# Patient Record
Sex: FEMALE | Race: WHITE | NOT HISPANIC OR LATINO | Employment: FULL TIME | URBAN - METROPOLITAN AREA
[De-identification: names, ages, dates, MRNs, and addresses within clinical notes are randomized per-mention and may not be internally consistent; named-entity substitution may affect disease eponyms.]

---

## 2017-01-25 ENCOUNTER — OFFICE VISIT (OUTPATIENT)
Dept: SPORTS MEDICINE | Facility: CLINIC | Age: 57
End: 2017-01-25

## 2017-01-25 VITALS
SYSTOLIC BLOOD PRESSURE: 118 MMHG | BODY MASS INDEX: 22.33 KG/M2 | DIASTOLIC BLOOD PRESSURE: 70 MMHG | WEIGHT: 134 LBS | HEIGHT: 65 IN

## 2017-01-25 DIAGNOSIS — M79.672 LEFT FOOT PAIN: Primary | ICD-10-CM

## 2017-01-25 PROCEDURE — 99204 OFFICE O/P NEW MOD 45 MIN: CPT | Performed by: FAMILY MEDICINE

## 2017-02-08 ENCOUNTER — HOSPITAL ENCOUNTER (OUTPATIENT)
Dept: CARDIOLOGY | Facility: HOSPITAL | Age: 57
Discharge: HOME | End: 2017-02-08
Admitting: FAMILY MEDICINE

## 2017-02-08 ENCOUNTER — HOSPITAL ENCOUNTER (OUTPATIENT)
Dept: CARDIOLOGY | Facility: HOSPITAL | Age: 57
Discharge: HOME | End: 2017-02-08

## 2017-02-08 DIAGNOSIS — M79.672 LEFT FOOT PAIN: ICD-10-CM

## 2017-02-08 LAB
BH CV LOWER ARTERIAL LEFT ABI RATIO: 1.2
BH CV LOWER ARTERIAL LEFT DORSALIS PEDIS SYS MAX: 138 MMHG
BH CV LOWER ARTERIAL LEFT GREAT TOE SYS MAX: 104 MMHG
BH CV LOWER ARTERIAL LEFT POST EX ABI RATIO: 1.1
BH CV LOWER ARTERIAL LEFT POST TIBIAL SYS MAX: 150 MMHG
BH CV LOWER ARTERIAL LEFT TBI RATIO: 0.85
BH CV LOWER ARTERIAL RIGHT ABI RATIO: 1.3
BH CV LOWER ARTERIAL RIGHT DORSALIS PEDIS SYS MAX: 153 MMHG
BH CV LOWER ARTERIAL RIGHT GREAT TOE SYS MAX: 108 MMHG
BH CV LOWER ARTERIAL RIGHT POST EX ABI RATIO: 1
BH CV LOWER ARTERIAL RIGHT POST TIBIAL SYS MAX: 147 MMHG
BH CV LOWER ARTERIAL RIGHT TBI RATIO: 0.89
BH CV LOWER VASCULAR LEFT COMMON FEMORAL AUGMENT: NORMAL
BH CV LOWER VASCULAR LEFT COMMON FEMORAL COMPETENT: NORMAL
BH CV LOWER VASCULAR LEFT COMMON FEMORAL COMPRESS: NORMAL
BH CV LOWER VASCULAR LEFT COMMON FEMORAL PHASIC: NORMAL
BH CV LOWER VASCULAR LEFT COMMON FEMORAL SPONT: NORMAL
BH CV LOWER VASCULAR LEFT DISTAL FEMORAL COMPRESS: NORMAL
BH CV LOWER VASCULAR LEFT GASTRONEMIUS COMPRESS: NORMAL
BH CV LOWER VASCULAR LEFT GREATER SAPH AK COMPRESS: NORMAL
BH CV LOWER VASCULAR LEFT GREATER SAPH BK COMPRESS: NORMAL
BH CV LOWER VASCULAR LEFT LESSER SAPH COMPRESS: NORMAL
BH CV LOWER VASCULAR LEFT MID FEMORAL AUGMENT: NORMAL
BH CV LOWER VASCULAR LEFT MID FEMORAL COMPETENT: NORMAL
BH CV LOWER VASCULAR LEFT MID FEMORAL COMPRESS: NORMAL
BH CV LOWER VASCULAR LEFT MID FEMORAL PHASIC: NORMAL
BH CV LOWER VASCULAR LEFT MID FEMORAL SPONT: NORMAL
BH CV LOWER VASCULAR LEFT PERONEAL COMPRESS: NORMAL
BH CV LOWER VASCULAR LEFT POPLITEAL AUGMENT: NORMAL
BH CV LOWER VASCULAR LEFT POPLITEAL COMPETENT: NORMAL
BH CV LOWER VASCULAR LEFT POPLITEAL COMPRESS: NORMAL
BH CV LOWER VASCULAR LEFT POPLITEAL PHASIC: NORMAL
BH CV LOWER VASCULAR LEFT POPLITEAL SPONT: NORMAL
BH CV LOWER VASCULAR LEFT POSTERIOR TIBIAL COMPRESS: NORMAL
BH CV LOWER VASCULAR LEFT PROXIMAL FEMORAL COMPRESS: NORMAL
BH CV LOWER VASCULAR LEFT SAPHENOFEMORAL JUNCTION AUGMENT: NORMAL
BH CV LOWER VASCULAR LEFT SAPHENOFEMORAL JUNCTION COMPETENT: NORMAL
BH CV LOWER VASCULAR LEFT SAPHENOFEMORAL JUNCTION COMPRESS: NORMAL
BH CV LOWER VASCULAR LEFT SAPHENOFEMORAL JUNCTION PHASIC: NORMAL
BH CV LOWER VASCULAR LEFT SAPHENOFEMORAL JUNCTION SPONT: NORMAL
BH CV LOWER VASCULAR RIGHT COMMON FEMORAL AUGMENT: NORMAL
BH CV LOWER VASCULAR RIGHT COMMON FEMORAL COMPETENT: NORMAL
BH CV LOWER VASCULAR RIGHT COMMON FEMORAL COMPRESS: NORMAL
BH CV LOWER VASCULAR RIGHT COMMON FEMORAL PHASIC: NORMAL
BH CV LOWER VASCULAR RIGHT COMMON FEMORAL SPONT: NORMAL
UPPER ARTERIAL LEFT ARM BRACHIAL SYS MAX: 121 MMHG
UPPER ARTERIAL RIGHT ARM BRACHIAL SYS MAX: 122 MMHG

## 2017-02-08 PROCEDURE — 93971 EXTREMITY STUDY: CPT

## 2017-02-08 PROCEDURE — 93924 LWR XTR VASC STDY BILAT: CPT

## 2017-02-17 DIAGNOSIS — M79.672 LEFT FOOT PAIN: Primary | ICD-10-CM

## 2017-02-23 ENCOUNTER — HOSPITAL ENCOUNTER (OUTPATIENT)
Dept: INFUSION THERAPY | Facility: HOSPITAL | Age: 57
Discharge: HOME | End: 2017-02-23
Attending: PSYCHIATRY & NEUROLOGY | Admitting: PSYCHIATRY & NEUROLOGY

## 2017-02-23 DIAGNOSIS — M79.672 LEFT FOOT PAIN: ICD-10-CM

## 2017-02-23 PROCEDURE — 95908 NRV CNDJ TST 3-4 STUDIES: CPT

## 2017-02-23 PROCEDURE — 95908 NRV CNDJ TST 3-4 STUDIES: CPT | Performed by: PSYCHIATRY & NEUROLOGY

## 2017-02-23 PROCEDURE — 95886 MUSC TEST DONE W/N TEST COMP: CPT | Performed by: PSYCHIATRY & NEUROLOGY

## 2017-02-23 PROCEDURE — 95886 MUSC TEST DONE W/N TEST COMP: CPT

## 2017-03-23 ENCOUNTER — OFFICE VISIT (OUTPATIENT)
Dept: SPORTS MEDICINE | Facility: CLINIC | Age: 57
End: 2017-03-23

## 2017-03-23 VITALS
WEIGHT: 134 LBS | HEIGHT: 65 IN | DIASTOLIC BLOOD PRESSURE: 74 MMHG | SYSTOLIC BLOOD PRESSURE: 116 MMHG | BODY MASS INDEX: 22.33 KG/M2

## 2017-03-23 DIAGNOSIS — G89.29 CHRONIC PAIN OF LEFT ANKLE: Primary | ICD-10-CM

## 2017-03-23 DIAGNOSIS — M25.872 ANKLE IMPINGEMENT SYNDROME, LEFT: ICD-10-CM

## 2017-03-23 DIAGNOSIS — M25.572 CHRONIC PAIN OF LEFT ANKLE: Primary | ICD-10-CM

## 2017-03-23 PROCEDURE — 20605 DRAIN/INJ JOINT/BURSA W/O US: CPT | Performed by: FAMILY MEDICINE

## 2017-03-23 PROCEDURE — 73610 X-RAY EXAM OF ANKLE: CPT | Performed by: FAMILY MEDICINE

## 2017-03-23 PROCEDURE — 99214 OFFICE O/P EST MOD 30 MIN: CPT | Performed by: FAMILY MEDICINE

## 2017-03-23 RX ORDER — MELOXICAM 15 MG/1
TABLET ORAL
Refills: 5 | COMMUNITY
Start: 2017-02-18

## 2017-03-23 RX ORDER — BACLOFEN 10 MG/1
TABLET ORAL
Refills: 0 | COMMUNITY
Start: 2017-01-13

## 2017-03-23 RX ORDER — PREDNISONE 10 MG/1
TABLET ORAL
Refills: 0 | COMMUNITY
Start: 2017-01-06 | End: 2021-09-17

## 2017-03-23 RX ORDER — PNV NO.95/FERROUS FUM/FOLIC AC 28MG-0.8MG
TABLET ORAL
COMMUNITY
End: 2021-09-17

## 2017-03-23 RX ORDER — RESVER/WINE/BFL/GRPSD/PC/C/POM 200MG-60MG
CAPSULE ORAL
Refills: 11 | COMMUNITY
Start: 2017-03-02

## 2017-03-23 RX ORDER — MELOXICAM 15 MG/1
TABLET ORAL
COMMUNITY
Start: 2015-02-17 | End: 2021-09-17

## 2017-03-23 RX ORDER — FERROUS SULFATE 325(65) MG
TABLET ORAL
Refills: 2 | COMMUNITY
Start: 2017-02-20 | End: 2021-09-17

## 2017-03-23 RX ORDER — TRIAMCINOLONE ACETONIDE 40 MG/ML
40 INJECTION, SUSPENSION INTRA-ARTICULAR; INTRAMUSCULAR ONCE
Status: COMPLETED | OUTPATIENT
Start: 2017-03-23 | End: 2017-03-23

## 2017-03-23 RX ADMIN — TRIAMCINOLONE ACETONIDE 40 MG: 40 INJECTION, SUSPENSION INTRA-ARTICULAR; INTRAMUSCULAR at 14:24

## 2017-03-23 NOTE — PROGRESS NOTES
"Marclea is a 56 y.o. year old female    Chief Complaint   Patient presents with   • Left Foot - Follow-up       History of Present Illness  Here today to follow-up on persistent left foot and ankle pain.  Reviewed her nerve conduction study and vascular testing which was all normal.  Of note, the pressure in her dorsalis pedis was slightly lower on the left side compared to the right, but still within normal limits.  She continues to have relatively sharp pain, currently more localized to the anterior lateral aspect of the ankle.  Not specifically aggravated during running, but worse afterward.  Mild to moderate severity, is able to train as tolerated still.    I have reviewed the patient's medical history in detail and updated the computerized patient record.    Review of Systems   Constitutional: Negative.    Skin:        Continues to have some bruising on the dorsal aspect of the left foot   Neurological: Negative.        /74  Ht 65\" (165.1 cm)  Wt 134 lb (60.8 kg)  BMI 22.3 kg/m2     Physical Exam    Vital signs reviewed.   General: No acute distress.  Eyes: conjunctiva clear; pupils equally round and reactive  ENT: external ears and nose atraumatic; oropharynx clear  CV: no peripheral edema, 2+ distal pulses  Resp: normal respiratory effort, no use of accessory muscles  Skin: no rashes or wounds; normal turgor  Psych: mood and affect appropriate; recent and remote memory intact  Neuro: sensation to light touch intact    MSK Exam:  L ankle: Normal appearance. TTP lateral talar dome.  Normal range of motion.  Mild pain with maximum dorsiflexion.  Normal strength without pain.  Normal stability.    Left Ankle X-Ray  Indication: Pain  Views: AP, Lateral, Mortise    Findings:  No fracture  No bony lesion  Soft tissues normal  Normal joint spaces    No prior studies available for comparison.    Joint Injection Procedure Note    Left ankle joint injection was discussed with the patient in detail, including " "indication, risks, benefits, and alternatives. Verbal consent was given for the procedure. Injection was performed by MD.  Injection site was identified by physical examination and cleaned with Betadine and alcohol swabs. Prior to needle insertion, ethyl chloride spray was used for surface anesthesia. Sterile technique was used.   A 25-gauge, 1.5\" needle was directed to the joint from a(n) lateral and anterior approach. Injectate was passed into the joint space without difficulty. The needle was removed and a simple bandage was applied. The procedure was tolerated well without difficulty.    Injection mixture:  1% lidocaine without epinephrine: 1 mL  0.5% bupivacaine without epinephrine: 1 mL  40 mg/mL triamcinolone acetonide: 1 mL    Diagnoses and all orders for this visit:    Chronic pain of left ankle  -     XR Ankle 3+ View Left    Ankle impingement syndrome, left  -     triamcinolone acetonide (KENALOG-40) injection 40 mg; Inject 1 mL into the joint 1 (One) Time.    Other orders  -     ferrous sulfate 325 (65 FE) MG tablet; TAKE 1 TABLET BY MOUTH TWICE A DAY  -     Ferrous Sulfate (IRON) 325 (65 FE) MG tablet; Take  by mouth.  -     meloxicam (MOBIC) 15 MG tablet; TAKE 1 TABLET BY MOUTH EVERY DAY  -     Conj Estrogens-Bazedoxifene 0.45-20 MG tablet; Take 1 tablet by mouth.  -     D-5000 5000 UNITS tablet; TAKE 1 TABLET DAILY  -     baclofen (LIORESAL) 10 MG tablet; TAKE 1 TABLET BY MOUTH 3 TIMES A DAY AS NEEDED FOR PAIN  -     meloxicam (MOBIC) 15 MG tablet; TAKE 1 TABLET DAILY.  -     PredniSONE (DELTASONE) 10 MG (21) tablet pack; TAKE AS DIRECTED    We discussed potential considerations of her ongoing pain.  While it might not be the only factor, with a primary factor, she has clear symptoms consistent with impingement.  Agreed upon injection today for diagnostic/therapeutic benefit.  This was done without difficulty.  We'll follow-up in 3 weeks to check progress.  "

## 2017-04-13 ENCOUNTER — OFFICE VISIT (OUTPATIENT)
Dept: SPORTS MEDICINE | Facility: CLINIC | Age: 57
End: 2017-04-13

## 2017-04-13 VITALS
HEIGHT: 65 IN | BODY MASS INDEX: 22.33 KG/M2 | SYSTOLIC BLOOD PRESSURE: 110 MMHG | WEIGHT: 134 LBS | DIASTOLIC BLOOD PRESSURE: 64 MMHG

## 2017-04-13 DIAGNOSIS — M25.872 ANKLE IMPINGEMENT SYNDROME, LEFT: Primary | ICD-10-CM

## 2017-04-13 DIAGNOSIS — R29.898 COMPLAINTS OF LEG WEAKNESS: ICD-10-CM

## 2017-04-13 DIAGNOSIS — M51.26 LUMBAR DISC HERNIATION: ICD-10-CM

## 2017-04-13 PROCEDURE — 99213 OFFICE O/P EST LOW 20 MIN: CPT | Performed by: FAMILY MEDICINE

## 2017-04-18 NOTE — PROGRESS NOTES
"Marcela is a 56 y.o. year old female    Chief Complaint   Patient presents with   • Left Foot - Follow-up       History of Present Illness  Here today to follow-up on left foot/ankle pain.  Notes that her pain has improved significantly since her last visit the ankle.  She still notes some irregularity in her gait, particularly with some weakness in the right leg.  She notices this more with cycling than she does with running.  Also has previous history of low back pain with lumbar disc herniation that was treated nonsurgically years ago.    I have reviewed the patient's medical history in detail and updated the computerized patient record.    Review of Systems   Constitutional: Negative.    Musculoskeletal: Negative for joint swelling.   Neurological: Negative for numbness.       /64  Ht 65\" (165.1 cm)  Wt 134 lb (60.8 kg)  BMI 22.3 kg/m2     Physical Exam    Vital signs reviewed.   General: No acute distress.  Eyes: conjunctiva clear; pupils equally round and reactive  ENT: external ears and nose atraumatic; oropharynx clear  CV: no peripheral edema, 2+ distal pulses  Resp: normal respiratory effort, no use of accessory muscles  Skin: no rashes or wounds; normal turgor  Psych: mood and affect appropriate; recent and remote memory intact  Neuro: sensation to light touch intact    MSK Exam:  Left ankle: normal appearance.  No tenderness.  Normal range of motion. No laxity.    Lumbar spine: Normal appearance.  No tenderness.  Normal range of motion.  Negative straight leg raise and slump.  There is some mild weakness with hip flexion and knee extension on the right side compared to left.  Symmetric reflexes.     Diagnoses and all orders for this visit:    Ankle impingement syndrome, left    Complaints of leg weakness  -     MRI Lumbar Spine Without Contrast; Future    Lumbar disc herniation  -     MRI Lumbar Spine Without Contrast; Future      Bilateral ankle impingement has improved.  Given the ongoing nature " of this, I'm concerned that she has an disc herniation worsened from her previous herniation causing her leg weakness that has been resulting in ankle overuse and impingement.

## 2017-04-20 ENCOUNTER — HOSPITAL ENCOUNTER (OUTPATIENT)
Dept: MRI IMAGING | Facility: HOSPITAL | Age: 57
Discharge: HOME | End: 2017-04-20
Admitting: FAMILY MEDICINE

## 2017-04-20 DIAGNOSIS — M51.26 LUMBAR DISC HERNIATION: ICD-10-CM

## 2017-04-20 DIAGNOSIS — R29.898 COMPLAINTS OF LEG WEAKNESS: ICD-10-CM

## 2017-04-20 PROCEDURE — 72148 MRI LUMBAR SPINE W/O DYE: CPT

## 2017-04-28 DIAGNOSIS — R29.898 COMPLAINTS OF LEG WEAKNESS: ICD-10-CM

## 2017-04-28 DIAGNOSIS — M25.872 ANKLE IMPINGEMENT SYNDROME, LEFT: Primary | ICD-10-CM

## 2017-05-15 ENCOUNTER — TREATMENT (OUTPATIENT)
Dept: PHYSICAL THERAPY | Facility: CLINIC | Age: 57
End: 2017-05-15

## 2017-05-15 DIAGNOSIS — M25.872 ANKLE IMPINGEMENT SYNDROME, LEFT: Primary | ICD-10-CM

## 2017-05-15 DIAGNOSIS — R29.898 WEAKNESS OF RIGHT LOWER EXTREMITY: ICD-10-CM

## 2017-05-15 PROCEDURE — 97162 PT EVAL MOD COMPLEX 30 MIN: CPT | Performed by: PHYSICAL THERAPIST

## 2017-05-15 PROCEDURE — 97112 NEUROMUSCULAR REEDUCATION: CPT | Performed by: PHYSICAL THERAPIST

## 2017-05-15 PROCEDURE — 97116 GAIT TRAINING THERAPY: CPT | Performed by: PHYSICAL THERAPIST

## 2017-06-15 ENCOUNTER — DOCUMENTATION (OUTPATIENT)
Dept: PHYSICAL THERAPY | Facility: CLINIC | Age: 57
End: 2017-06-15

## 2017-06-15 DIAGNOSIS — M25.872 ANKLE IMPINGEMENT SYNDROME, LEFT: Primary | ICD-10-CM

## 2017-06-15 DIAGNOSIS — R29.898 WEAKNESS OF RIGHT LOWER EXTREMITY: ICD-10-CM

## 2017-06-15 NOTE — PROGRESS NOTES
Discharge Report    Patient Name: Marcela Cunha       Patient MRN: [id number]  : 1960  Physician:Butch Hayes MD   Date: 6/15/2017    Encounter Diagnoses   Name Primary?   • Ankle impingement syndrome, left Yes   • Weakness of right lower extremity        Treatment has included neuromuscular re-education and gait training    Assessment: pt came for intial evaluation and preferred a one time visit only.    Progress towards goals: unknown    Discharge Reason: Anticipate patient will achieve long-term goals independently      Plan of Care: Continue with current home exercise program as instructed    Prognosis: good    Understanding at Discharge: good

## 2021-09-16 ENCOUNTER — LAB (OUTPATIENT)
Dept: LAB | Facility: HOSPITAL | Age: 61
End: 2021-09-16

## 2021-09-16 ENCOUNTER — HOSPITAL ENCOUNTER (OUTPATIENT)
Dept: CARDIOLOGY | Facility: HOSPITAL | Age: 61
Discharge: HOME | End: 2021-09-16

## 2021-09-16 ENCOUNTER — HOSPITAL ENCOUNTER (OUTPATIENT)
Dept: GENERAL RADIOLOGY | Facility: HOSPITAL | Age: 61
Discharge: HOME | End: 2021-09-16

## 2021-09-16 ENCOUNTER — TRANSCRIBE ORDERS (OUTPATIENT)
Dept: ADMINISTRATIVE | Facility: HOSPITAL | Age: 61
End: 2021-09-16

## 2021-09-16 DIAGNOSIS — Z01.818 PRE-OPERATIVE EXAM: ICD-10-CM

## 2021-09-16 DIAGNOSIS — Z01.818 PRE-OPERATIVE EXAM: Primary | ICD-10-CM

## 2021-09-16 LAB
ALBUMIN SERPL-MCNC: 4.4 G/DL (ref 3.5–5.2)
ALBUMIN/GLOB SERPL: 1.7 G/DL
ALP SERPL-CCNC: 93 U/L (ref 39–117)
ALT SERPL W P-5'-P-CCNC: 61 U/L (ref 1–33)
ANION GAP SERPL CALCULATED.3IONS-SCNC: 13.7 MMOL/L (ref 5–15)
APTT PPP: 27.7 SECONDS (ref 22.7–35.4)
AST SERPL-CCNC: 42 U/L (ref 1–32)
BACTERIA UR QL AUTO: NORMAL /HPF
BASOPHILS # BLD AUTO: 0.09 10*3/MM3 (ref 0–0.2)
BASOPHILS NFR BLD AUTO: 1.4 % (ref 0–1.5)
BILIRUB SERPL-MCNC: 0.3 MG/DL (ref 0–1.2)
BILIRUB UR QL STRIP: NEGATIVE
BUN SERPL-MCNC: 13 MG/DL (ref 6–20)
BUN/CREAT SERPL: 18.8 (ref 7–25)
CALCIUM SPEC-SCNC: 9.4 MG/DL (ref 8.6–10.5)
CHLORIDE SERPL-SCNC: 106 MMOL/L (ref 98–107)
CLARITY UR: CLEAR
CO2 SERPL-SCNC: 22.3 MMOL/L (ref 22–29)
COLOR UR: YELLOW
CREAT SERPL-MCNC: 0.69 MG/DL (ref 0.57–1)
DEPRECATED RDW RBC AUTO: 45.2 FL (ref 37–54)
EOSINOPHIL # BLD AUTO: 0.29 10*3/MM3 (ref 0–0.4)
EOSINOPHIL NFR BLD AUTO: 4.6 % (ref 0.3–6.2)
ERYTHROCYTE [DISTWIDTH] IN BLOOD BY AUTOMATED COUNT: 13.6 % (ref 12.3–15.4)
GFR SERPL CREATININE-BSD FRML MDRD: 87 ML/MIN/1.73
GLOBULIN UR ELPH-MCNC: 2.6 GM/DL
GLUCOSE SERPL-MCNC: 84 MG/DL (ref 65–99)
GLUCOSE UR STRIP-MCNC: NEGATIVE MG/DL
HCT VFR BLD AUTO: 37 % (ref 34–46.6)
HGB BLD-MCNC: 11.8 G/DL (ref 12–15.9)
HGB UR QL STRIP.AUTO: NEGATIVE
HYALINE CASTS UR QL AUTO: NORMAL /LPF
IMM GRANULOCYTES # BLD AUTO: 0.02 10*3/MM3 (ref 0–0.05)
IMM GRANULOCYTES NFR BLD AUTO: 0.3 % (ref 0–0.5)
INR PPP: 0.92 (ref 0.9–1.1)
KETONES UR QL STRIP: NEGATIVE
LEUKOCYTE ESTERASE UR QL STRIP.AUTO: ABNORMAL
LYMPHOCYTES # BLD AUTO: 2.36 10*3/MM3 (ref 0.7–3.1)
LYMPHOCYTES NFR BLD AUTO: 37.8 % (ref 19.6–45.3)
MCH RBC QN AUTO: 28.9 PG (ref 26.6–33)
MCHC RBC AUTO-ENTMCNC: 31.9 G/DL (ref 31.5–35.7)
MCV RBC AUTO: 90.5 FL (ref 79–97)
MONOCYTES # BLD AUTO: 0.57 10*3/MM3 (ref 0.1–0.9)
MONOCYTES NFR BLD AUTO: 9.1 % (ref 5–12)
NEUTROPHILS NFR BLD AUTO: 2.92 10*3/MM3 (ref 1.7–7)
NEUTROPHILS NFR BLD AUTO: 46.8 % (ref 42.7–76)
NITRITE UR QL STRIP: NEGATIVE
NRBC BLD AUTO-RTO: 0 /100 WBC (ref 0–0.2)
PH UR STRIP.AUTO: 6.5 [PH] (ref 5–8)
PLATELET # BLD AUTO: 318 10*3/MM3 (ref 140–450)
PMV BLD AUTO: 9.2 FL (ref 6–12)
POTASSIUM SERPL-SCNC: 4.2 MMOL/L (ref 3.5–5.2)
PROT SERPL-MCNC: 7 G/DL (ref 6–8.5)
PROT UR QL STRIP: NEGATIVE
PROTHROMBIN TIME: 12.2 SECONDS (ref 11.7–14.2)
QT INTERVAL: 444 MS
RBC # BLD AUTO: 4.09 10*6/MM3 (ref 3.77–5.28)
RBC # UR: NORMAL /HPF
REF LAB TEST METHOD: NORMAL
SODIUM SERPL-SCNC: 142 MMOL/L (ref 136–145)
SP GR UR STRIP: 1.01 (ref 1–1.03)
SQUAMOUS #/AREA URNS HPF: NORMAL /HPF
UROBILINOGEN UR QL STRIP: ABNORMAL
WBC # BLD AUTO: 6.25 10*3/MM3 (ref 3.4–10.8)
WBC UR QL AUTO: NORMAL /HPF

## 2021-09-16 PROCEDURE — 93010 ELECTROCARDIOGRAM REPORT: CPT | Performed by: INTERNAL MEDICINE

## 2021-09-16 PROCEDURE — 80053 COMPREHEN METABOLIC PANEL: CPT

## 2021-09-16 PROCEDURE — 85610 PROTHROMBIN TIME: CPT

## 2021-09-16 PROCEDURE — 71046 X-RAY EXAM CHEST 2 VIEWS: CPT

## 2021-09-16 PROCEDURE — 36415 COLL VENOUS BLD VENIPUNCTURE: CPT

## 2021-09-16 PROCEDURE — 85730 THROMBOPLASTIN TIME PARTIAL: CPT

## 2021-09-16 PROCEDURE — 93005 ELECTROCARDIOGRAM TRACING: CPT | Performed by: ORTHOPAEDIC SURGERY

## 2021-09-16 PROCEDURE — 81001 URINALYSIS AUTO W/SCOPE: CPT

## 2021-09-16 PROCEDURE — 85025 COMPLETE CBC W/AUTO DIFF WBC: CPT
